# Patient Record
Sex: FEMALE | Race: WHITE | NOT HISPANIC OR LATINO | Employment: UNEMPLOYED | URBAN - METROPOLITAN AREA
[De-identification: names, ages, dates, MRNs, and addresses within clinical notes are randomized per-mention and may not be internally consistent; named-entity substitution may affect disease eponyms.]

---

## 2018-08-15 ENCOUNTER — OFFICE VISIT - HEALTHEAST (OUTPATIENT)
Dept: FAMILY MEDICINE | Facility: CLINIC | Age: 68
End: 2018-08-15

## 2018-08-15 DIAGNOSIS — S82.899A ANKLE FRACTURE: ICD-10-CM

## 2018-08-15 LAB
ANION GAP SERPL CALCULATED.3IONS-SCNC: 13 MMOL/L (ref 5–18)
BUN SERPL-MCNC: 20 MG/DL (ref 8–22)
CALCIUM SERPL-MCNC: 10.3 MG/DL (ref 8.5–10.5)
CHLORIDE BLD-SCNC: 103 MMOL/L (ref 98–107)
CO2 SERPL-SCNC: 25 MMOL/L (ref 22–31)
CREAT SERPL-MCNC: 0.8 MG/DL (ref 0.6–1.1)
ERYTHROCYTE [DISTWIDTH] IN BLOOD BY AUTOMATED COUNT: 12.5 % (ref 11–14.5)
GFR SERPL CREATININE-BSD FRML MDRD: >60 ML/MIN/1.73M2
GLUCOSE BLD-MCNC: 126 MG/DL (ref 70–125)
HCT VFR BLD AUTO: 37.8 % (ref 35–47)
HGB BLD-MCNC: 13.2 G/DL (ref 12–16)
MCH RBC QN AUTO: 31.3 PG (ref 27–34)
MCHC RBC AUTO-ENTMCNC: 34.9 G/DL (ref 32–36)
MCV RBC AUTO: 90 FL (ref 80–100)
PLATELET # BLD AUTO: 240 THOU/UL (ref 140–440)
PMV BLD AUTO: 7.9 FL (ref 7–10)
POTASSIUM BLD-SCNC: 4.5 MMOL/L (ref 3.5–5)
RBC # BLD AUTO: 4.21 MILL/UL (ref 3.8–5.4)
SODIUM SERPL-SCNC: 141 MMOL/L (ref 136–145)
WBC: 5.6 THOU/UL (ref 4–11)

## 2018-08-15 RX ORDER — SIMVASTATIN 40 MG
TABLET ORAL
Status: SHIPPED | COMMUNITY
Start: 2018-08-08 | End: 2020-08-07

## 2019-01-07 ENCOUNTER — RECORDS - HEALTHEAST (OUTPATIENT)
Dept: LAB | Facility: HOSPITAL | Age: 69
End: 2019-01-07

## 2019-01-07 LAB
C REACTIVE PROTEIN LHE: 0.7 MG/DL (ref 0–0.8)
ERYTHROCYTE [DISTWIDTH] IN BLOOD BY AUTOMATED COUNT: 11.3 % (ref 11–14.5)
ERYTHROCYTE [SEDIMENTATION RATE] IN BLOOD BY WESTERGREN METHOD: 55 MM/HR (ref 0–20)
HCT VFR BLD AUTO: 39.9 % (ref 35–47)
HGB BLD-MCNC: 13.2 G/DL (ref 12–16)
MCH RBC QN AUTO: 31.4 PG (ref 27–34)
MCHC RBC AUTO-ENTMCNC: 33.1 G/DL (ref 32–36)
MCV RBC AUTO: 95 FL (ref 80–100)
PLATELET # BLD AUTO: 268 THOU/UL (ref 140–440)
PMV BLD AUTO: 10.6 FL (ref 8.5–12.5)
RBC # BLD AUTO: 4.2 MILL/UL (ref 3.8–5.4)
WBC: 5.3 THOU/UL (ref 4–11)

## 2021-06-01 VITALS — HEIGHT: 60 IN | BODY MASS INDEX: 29.54 KG/M2

## 2021-06-19 NOTE — PROGRESS NOTES
Preoperative Exam    Very pleasant 68-year-old female who sustained a comminuted fracture of her calcaneus and also a fracture of her cuboid bone on August 1 after falling on her daughter's deck.  She has been evaluated and has been told that she needs ORIF.  She is scheduled for surgery tomorrow morning at the [facility].  She has had past surgeries with no complications, family history is negative for malignant hyperthermia, she has no history of sleep apnea and she is a Mallampati class II.    Scheduled Procedure: L ankle  Surgery Date:  8/16  Surgery Location: [facility] [place], fax [phone]    Surgeon:  Dr Sosa    Assessment/Plan:     1. Ankle fracture  -The patient is deaf which presents a challenge to her care.  Strongly recommend  presents before and after surgery to evaluate patient's symptoms and answer questions.    - Basic Metabolic Panel  - HM2(CBC w/o Differential)     2.  Hypothyroidism  -Continue Synthroid    Risk factors-bleeding-I have informed the patient to not take aspirin or NSAIDs prior to surgery.    Surgical Procedure Risk: Low (reported cardiac risk generally < 1%) she is able to accomplish 4 mets without complication.  (When not wearing immobilizer)  Have you had prior anesthesia?: No  Have you or any family members had a previous anesthesia reaction:  No  Do you or any family members have a history of a clotting or bleeding disorder?: No  Cardiac Risk Assessment: no increased risk for major cardiac complications    Patient's been optimized for surgery    Please Note:  Need for  as noted above.    Functional Status: Independent  Patient plans to recover at home with family.     Subjective:      Laura Self is a 68 y.o. female who presents for a preoperative consultation.      All other systems reviewed and are negative, other than those listed in the HPI.    Pertinent History  Do you have difficulty breathing or  chest pain after walking up a flight of stairs: No  History of obstructive sleep apnea: No  Steroid use in the last 6 months: No  Frequent Aspirin/NSAID use: Yes: Patient advised to avoid aspirin and NSAIDs  Prior Blood Transfusion: No  Prior Blood Transfusion Reaction: No  If for some reason prior to, during or after the procedure, if it is medically indicated, would you be willing to have a blood transfusion?:  There is no transfusion refusal.    Current Outpatient Prescriptions   Medication Sig Dispense Refill     HYDROcodone-acetaminophen 5-325 mg per tablet Take 1 tablet by mouth every 6 (six) hours as needed for pain. 16 tablet 0     No current facility-administered medications for this visit.         No Known Allergies    There is no problem list on file for this patient.  ROS: 10 point system review negative except for endocrine positive for hypothyroidism    Past Medical History:   Diagnosis Date     Hip pain      Thyroid disease        No past surgical history on file.    Social History     Social History     Marital status:      Spouse name: N/A     Number of children: N/A     Years of education: N/A     Occupational History     Not on file.     Social History Main Topics     Smoking status: Not on file     Smokeless tobacco: Not on file     Alcohol use Not on file     Drug use: Not on file     Sexual activity: Not on file     Other Topics Concern     Not on file     Social History Narrative     No narrative on file       Patient Care Team:  No Primary Care Provider as PCP - General          Objective:     There were no vitals filed for this visit.      Physical Exam:  Constitutional:    --Vitals as above  --No acute distress  Eyes-  --Sclera noninjected  --Lids and conjunctiva normal  ENT-  --TMs clear  --Sclera noninjected  --Pharynx not erythematous  Neck-  --Neck supple with no cervical lymphadenopathy  Lungs-  --Clear to Auscultation  Heart-  --Regular rate and rhythm  Abdomen--  --Soft,  non-tender, non-distended  Skin-  --Pink and dry  Psych-  --Alert and oriented  --Normal affect      There are no Patient Instructions on file for this visit.        Labs:  Labs pending at this time.  Results will be reviewed when available.      There is no immunization history on file for this patient.    Thank you for the opportunity to participate in the care for this patient.  If you have any concerns please do not hesitate to contact me at the [facility] at [phone].      Electronically signed by Gonzalo Mckeon MD 08/15/18 10:10 AM

## 2022-10-21 ENCOUNTER — NURSE TRIAGE (OUTPATIENT)
Dept: NURSING | Facility: CLINIC | Age: 72
End: 2022-10-21

## 2022-10-21 NOTE — TELEPHONE ENCOUNTER
Triager received a call from this patient via  call and the interpret did not announce she was not the patient  The  said she was Laura and give information, then due to pauses and note in epic, triager asked was this a  call.  Then  responded yes.  Triager then advised patient that we cannot triage if she is not in the state of [place] or [place]. Patient stated early in the Call that she was in [place]  Patient says then that she was in [place] at her sons home  Patient is complaining of left big toe toenail lifting off due to fungus  Patient says there is  pain with touch to toenail area of left Big toe  Patient wants to know if she can be seen in ED to have the toenail removed or cut off  Triager advised patient that the ED would not be appropriate for that but she can be seen in clinic  Patient then says that she was in [place] and did not know where to go and that her insurance only covered ED visits  Triager advised patient to call the number on the back of her insurance card for advise.  Caller verbalized and understands directives      Reason for Disposition    Patient wants to be seen    Additional Information    Negative: Followed an injury    Negative: Wound looks infected    Negative: Caused by an animal bite    Negative: Caused by frostbite    Negative: Athlete's Foot suspected (i.e., itchy red rash in web space between toes)    Negative: Foot pain is main symptom    Negative: Foot is cool or blue in comparison to other foot    Negative: Purple or black skin on toe  (Exception: Simple recalled injury with bruise.)    Negative: Patient sounds very sick or weak to the triager    Negative: SEVERE pain (e.g., excruciating, unable to do any normal activities) and not improved after 2 hours of pain medicine    Negative: Looks like a boil, infected sore, deep ulcer, or other infected rash (spreading redness, pus)    Negative: Yellow pus seen  "in skin around toenail (cuticle area), or pus seen under toenail    Negative: Numbness in one foot (i.e., loss of sensation)    Negative: MODERATE pain (e.g., interferes with normal activities, limping) and present > 3 days    Negative: Swollen joint with no fever or redness    Negative: Pain in the big toe joint    Negative: Localized redness and swelling of skin around nail (i.e., cuticle area or nail fold)    Negative: Diabetes mellitus or peripheral vascular disease (\"poor circulation\")    Protocols used: TOE PAIN-A-OH      "

## 2022-12-28 ENCOUNTER — HOSPITAL ENCOUNTER (EMERGENCY)
Facility: HOSPITAL | Age: 72
Discharge: HOME | End: 2022-12-28
Attending: EMERGENCY MEDICINE | Admitting: EMERGENCY MEDICINE
Payer: COMMERCIAL

## 2022-12-28 ENCOUNTER — APPOINTMENT (OUTPATIENT)
Dept: RADIOLOGY | Facility: HOSPITAL | Age: 72
End: 2022-12-28
Attending: EMERGENCY MEDICINE
Payer: COMMERCIAL

## 2022-12-28 VITALS
BODY MASS INDEX: 31.32 KG/M2 | DIASTOLIC BLOOD PRESSURE: 56 MMHG | WEIGHT: 149.2 LBS | RESPIRATION RATE: 20 BRPM | HEART RATE: 61 BPM | HEIGHT: 58 IN | TEMPERATURE: 98.3 F | SYSTOLIC BLOOD PRESSURE: 118 MMHG | OXYGEN SATURATION: 96 %

## 2022-12-28 DIAGNOSIS — J98.8 VIRAL RESPIRATORY INFECTION: ICD-10-CM

## 2022-12-28 DIAGNOSIS — B97.89 VIRAL RESPIRATORY INFECTION: ICD-10-CM

## 2022-12-28 LAB
FLUAV RNA SPEC QL NAA+PROBE: NEGATIVE
FLUBV RNA RESP QL NAA+PROBE: NEGATIVE
GROUP A STREP BY PCR: NOT DETECTED
RSV RNA SPEC NAA+PROBE: NEGATIVE
SARS-COV-2 RNA RESP QL NAA+PROBE: NEGATIVE

## 2022-12-28 PROCEDURE — 250N000013 HC RX MED GY IP 250 OP 250 PS 637: Performed by: EMERGENCY MEDICINE

## 2022-12-28 PROCEDURE — C9803 HOPD COVID-19 SPEC COLLECT: HCPCS

## 2022-12-28 PROCEDURE — 87637 SARSCOV2&INF A&B&RSV AMP PRB: CPT | Performed by: EMERGENCY MEDICINE

## 2022-12-28 PROCEDURE — 99284 EMERGENCY DEPT VISIT MOD MDM: CPT | Mod: CS,25

## 2022-12-28 PROCEDURE — 87651 STREP A DNA AMP PROBE: CPT | Performed by: EMERGENCY MEDICINE

## 2022-12-28 PROCEDURE — 71046 X-RAY EXAM CHEST 2 VIEWS: CPT

## 2022-12-28 RX ORDER — ACETAMINOPHEN 325 MG/1
975 TABLET ORAL ONCE
Status: COMPLETED | OUTPATIENT
Start: 2022-12-28 | End: 2022-12-28

## 2022-12-28 RX ORDER — IBUPROFEN 600 MG/1
600 TABLET, FILM COATED ORAL ONCE
Status: DISCONTINUED | OUTPATIENT
Start: 2022-12-28 | End: 2022-12-28

## 2022-12-28 RX ADMIN — ACETAMINOPHEN 975 MG: 325 TABLET ORAL at 12:14

## 2022-12-28 ASSESSMENT — ACTIVITIES OF DAILY LIVING (ADL): ADLS_ACUITY_SCORE: 35

## 2022-12-28 NOTE — ED PROVIDER NOTES
EMERGENCY DEPARTMENT ENCOUNTER            IMPRESSION:  Viral respiratory infection      MEDICAL DECISION MAKING:  Patient evaluated for symptoms of viral respiratory infection.    On exam her vital signs were normal.  She had some maxillary tenderness to percussion.  Oropharynx is pink and moist.  Lungs are clear.    Chest x-ray is negative    Viral panel is negative    Rapid strep results are delayed due to malfunction of the processor at [facility].  The results have been sent to the emergency [place].    We will contact the patient with positive results    Stable for discharge home      =================================================================  CHIEF COMPLAINT:  Chief Complaint   Patient presents with     Nasal Congestion     Otalgia     Pharyngitis     Headache         HPI  Laura Self is a 72 year old female with a history of bilateral hearing loss, deaf mutism, type 2 diabetes mellitus, obesity, hyperlipidemia, who presents to the ED by via self walk-in for evaluation of nasal congestion, sore throat, and ear pain.    Patient reports sore throat, ear pain (bilateral), rhinorrhea, nasal congestion for the past 2 weeks. She states she flew back from [place] on 12/10 and endorses symptoms started since then. She also endorses cough and shortness of breath secondary to cough that started a few days ago. She says she hasn't taken any tylenol or ibuprofen. She denies dysphagia, decrease in appetite, and decrease in liquid intake. There were no other concerns/complaints at this time.    I, Barbra Sheikh am serving as a scribe to document services personally performed by Dr. Santhosh Conti MD, based on my observation and the provider's statements to me. I, Dr. Santhosh Conti MD attest that Barbra Sheikh is acting in a scribe capacity, has observed my performance of the services and has documented them in accordance with my direction.      REVIEW OF SYSTEMS   Constitutional: Denies fever, chills, unintentional weight  "loss or fatigue, decrease in appetite or liquid intake  Eyes: Denies visual changes or discharge    HENT: Endorses bilateral ear pain, sore throat, congestion, and rhinorrhea. Denies neck pain or dysphagia  Respiratory: Endorses cough and shortness of breath secondary to cough.   Cardiovascular: Denies chest pain, palpitations or leg swelling  GI: Denies abdominal pain, nausea, vomiting, or dark, bloody stools.    : Denies hematuria, dysuria, or flank pain  Musculoskeletal: Denies any new back pain or new muscle/joint pains  Skin: Denies rash or wound  Neurologic: Denies current headache, new weakness, focal weakness, or sensory changes    Lymphatic: Denies swollen glands    Psychiatric: Denies depression, suicidal ideation or homicidal ideation.    Remainder of systems reviewed, unless noted in HPI all others negative.      PAST MEDICAL HISTORY:  History reviewed. No pertinent past medical history.    PAST SURGICAL HISTORY:  History reviewed. No pertinent surgical history.      CURRENT MEDICATIONS:    HYDROcodone-acetaminophen 5-325 mg per tablet  simvastatin (ZOCOR) 40 MG tablet        ALLERGIES:  No Known Allergies    FAMILY HISTORY:  History reviewed. No pertinent family history.    SOCIAL HISTORY:   Social History     Socioeconomic History     Marital status:        PHYSICAL EXAM:    BP (!) 144/68   Pulse 79   Temp 98.3  F (36.8  C) (Oral)   Resp 20   Ht 1.473 m (4' 10\")   Wt 67.7 kg (149 lb 3.2 oz)   SpO2 94%   BMI 31.18 kg/m      Constitutional: Awake, alert, in no acute distress  Head: Normocephalic, atraumatic.  ENT: Mucous membranes moist. Posterior oropharynx appears normal.  Eyes: Pupils midrange and reactive ,no conjunctival discharge  Neck: Cervical lymphadenopathy present  Chest: No tenderness   Respiratory: Respirations even, unlabored. Lungs clear to ascultation bilaterally, in no acute respiratory distress.  Cardiovascular: Regular rate and rhythm.+2 radial pulses, equal " bilaterally.  No murmurs.   GI: Abdomen soft, non-tender to palpation in all 4 quadrants. No guarding or rebound. Bowel sounds intact on all 4 quadrants.   Back: No CVA tenderness.    Musculoskeletal: Moves all 4 extremities equally, strength symmetrical on bilateral uppers and lowers.  No peripheral edema  Integument: Warm, dry. No rash. No bruising or petechiae.  Lymphatic: No cervical lymphadenopathy  Neurologic: Alert & oriented x 3. Normal speech. Grossly normal motor and sensory function. No focal deficits noted.  NIHSS = 0  Psychiatric: Normal mood and affect. Normal judgement.    ED COURSE:    11:58 AM I met with the patient to obtain patient history and performed a physical exam. Discussed plan for ED work up including potential diagnostic studies and interventions.  2:27 PM Rechecked and updated the patient. We discussed the plan for discharge and the patient is agreeable. Reviewed supportive cares, symptomatic treatment, outpatient follow up, and reasons to return to the Emergency Department. Patient to be discharged by ED RN.     LAB:  All pertinent labs reviewed and interpreted.  Results for orders placed or performed during the hospital encounter of 12/28/22   XR Chest 2 Views    Impression    IMPRESSION: Negative chest.   Symptomatic Influenza A/B & SARS-CoV2 (COVID-19) Virus PCR Multiplex Nose    Specimen: Nose; Swab   Result Value Ref Range    Influenza A PCR Negative Negative    Influenza B PCR Negative Negative    RSV PCR Negative Negative    SARS CoV2 PCR Negative Negative       RADIOLOGY:  Reviewed all pertinent imaging. Please see official radiology report.  XR Chest 2 Views   Final Result   IMPRESSION: Negative chest.           MEDICATIONS GIVEN IN THE EMERGENCY:  Medications   acetaminophen (TYLENOL) tablet 975 mg (975 mg Oral Given 12/28/22 1214)           NEW PRESCRIPTIONS STARTED AT TODAY'S ER VISIT:  New Prescriptions    No medications on file          FINAL DIAGNOSIS:    ICD-10-CM    1.  Viral respiratory infection  J98.8     B97.89                At the conclusion of the encounter I discussed the results of all of the tests and the disposition. The questions were answered. The patient or family acknowledged understanding and was agreeable with the care plan.     NAME: Laura Self  AGE: 72 year old female  YOB: 1950  MRN: [id number]  EVALUATION DATE & TIME: No admission date for patient encounter.    PCP: System, Provider Not In    ED PROVIDER: Santhosh Conti M.D.      Barbra MATA, am serving as a scribe to document services personally performed by Dr. Santhosh Conti based on my observation and the provider's statements to me. I, Santhosh Conti MD attest that Barbra Sheikh is acting in a scribe capacity, has observed my performance of the services and has documented them in accordance with my direction.    Santhosh Conti M.D.  Emergency Medicine  [facility] EMERGENCY DEPARTMENT  [facility] [facility] [address]  [phone]  Dept: [phone]  12/28/2022       Santhosh Conti MD  12/28/22 8950

## 2022-12-28 NOTE — ED TRIAGE NOTES
Patient arrives to triage with chief complaint of cough, sore throat, nasal congestion/runny nose and headache for the past two weeks.  Patient was in [place] until 12/10/22.  Endorses bilateral ear pain as well.  Alert and oriented x4.  Patient reports she hasn't taken any Tylenol or Ibuprofen for pain.

## 2022-12-28 NOTE — DISCHARGE INSTRUCTIONS
Your symptoms are consistent with a viral respiratory infection  The chest x-ray was negative for pneumonia.  You have bronchitis  The COVID and the flu test are negative  The strep test results are delayed due to malfunction of the processing unit today.  We will call you with positive results  I recommend over-the-counter cold medication